# Patient Record
Sex: MALE | Race: WHITE | NOT HISPANIC OR LATINO | URBAN - METROPOLITAN AREA
[De-identification: names, ages, dates, MRNs, and addresses within clinical notes are randomized per-mention and may not be internally consistent; named-entity substitution may affect disease eponyms.]

---

## 2021-08-13 ENCOUNTER — OFFICE (OUTPATIENT)
Dept: URBAN - METROPOLITAN AREA CLINIC 34 | Facility: CLINIC | Age: 51
End: 2021-08-13
Payer: OTHER GOVERNMENT

## 2021-08-13 VITALS
SYSTOLIC BLOOD PRESSURE: 156 MMHG | DIASTOLIC BLOOD PRESSURE: 102 MMHG | SYSTOLIC BLOOD PRESSURE: 135 MMHG | HEIGHT: 71 IN | TEMPERATURE: 97.9 F | DIASTOLIC BLOOD PRESSURE: 109 MMHG | HEART RATE: 92 BPM | WEIGHT: 253 LBS

## 2021-08-13 DIAGNOSIS — R19.4 CHANGE IN BOWEL HABIT: ICD-10-CM

## 2021-08-13 DIAGNOSIS — K62.5 HEMORRHAGE OF ANUS AND RECTUM: ICD-10-CM

## 2021-08-13 PROCEDURE — 99204 OFFICE O/P NEW MOD 45 MIN: CPT | Performed by: PHYSICIAN ASSISTANT

## 2021-08-13 NOTE — SERVICEHPINOTES
GASPER KNOWLES   is a   50   year old male who is being seen in consultation at the request of   MIGUEL LAGUERRE Dr.   for blood in stools. Patient reports that he saw his PCP due to increased frequency of BMs over the past 2 years. Had positive FOBT this year. Often has sense of needing to have a BM but even after having one, can feel a need to go again. Stools have been looser, and when formed, they have a groove in them. Has had bright red blood a couple of times on toilet tissue. No rectal pain. No abdominal pain. No N/V, fevers, weight loss. His brother has UC and PSC - now s/p liver biopsy. Has h/o heavier ETOH intake in the past but has minimized intake the past few months. Uses ibuprofen 800mg about 2x/week. Reports consuming a pretty healthy diet.

## 2021-08-19 ENCOUNTER — OFFICE (OUTPATIENT)
Dept: URBAN - METROPOLITAN AREA PATHOLOGY 18 | Facility: PATHOLOGY | Age: 51
End: 2021-08-19
Payer: OTHER GOVERNMENT

## 2021-08-19 ENCOUNTER — OFFICE (OUTPATIENT)
Dept: URBAN - METROPOLITAN AREA CLINIC 98 | Facility: CLINIC | Age: 51
End: 2021-08-19
Payer: OTHER GOVERNMENT

## 2021-08-19 VITALS
WEIGHT: 253 LBS | RESPIRATION RATE: 20 BRPM | RESPIRATION RATE: 17 BRPM | OXYGEN SATURATION: 98 % | DIASTOLIC BLOOD PRESSURE: 87 MMHG | TEMPERATURE: 98.1 F | TEMPERATURE: 98.8 F | HEART RATE: 98 BPM | HEIGHT: 71 IN | RESPIRATION RATE: 30 BRPM | SYSTOLIC BLOOD PRESSURE: 154 MMHG | SYSTOLIC BLOOD PRESSURE: 153 MMHG | DIASTOLIC BLOOD PRESSURE: 96 MMHG | DIASTOLIC BLOOD PRESSURE: 71 MMHG | SYSTOLIC BLOOD PRESSURE: 142 MMHG | SYSTOLIC BLOOD PRESSURE: 138 MMHG | RESPIRATION RATE: 14 BRPM | DIASTOLIC BLOOD PRESSURE: 97 MMHG | DIASTOLIC BLOOD PRESSURE: 77 MMHG | HEART RATE: 99 BPM | HEART RATE: 94 BPM | SYSTOLIC BLOOD PRESSURE: 146 MMHG | OXYGEN SATURATION: 96 % | HEART RATE: 89 BPM | HEART RATE: 101 BPM | HEART RATE: 103 BPM | OXYGEN SATURATION: 97 % | SYSTOLIC BLOOD PRESSURE: 161 MMHG | RESPIRATION RATE: 25 BRPM

## 2021-08-19 DIAGNOSIS — K92.2 GASTROINTESTINAL HEMORRHAGE, UNSPECIFIED: ICD-10-CM

## 2021-08-19 DIAGNOSIS — R19.4 CHANGE IN BOWEL HABIT: ICD-10-CM

## 2021-08-19 DIAGNOSIS — D12.2 BENIGN NEOPLASM OF ASCENDING COLON: ICD-10-CM

## 2021-08-19 DIAGNOSIS — K62.5 HEMORRHAGE OF ANUS AND RECTUM: ICD-10-CM

## 2021-08-19 PROBLEM — K63.5 POLYP OF COLON: Status: ACTIVE | Noted: 2021-08-19

## 2021-08-19 PROCEDURE — 88305 TISSUE EXAM BY PATHOLOGIST: CPT | Performed by: PATHOLOGY

## 2021-08-19 NOTE — SERVICEHPINOTES
Pt presents for colonoscopy due to change in bowel habits, fecal urgency, and blood per rectum. No family history of colon cancer. Brother had UC.